# Patient Record
Sex: MALE
[De-identification: names, ages, dates, MRNs, and addresses within clinical notes are randomized per-mention and may not be internally consistent; named-entity substitution may affect disease eponyms.]

---

## 2018-05-06 ENCOUNTER — HOSPITAL ENCOUNTER (EMERGENCY)
Dept: HOSPITAL 46 - ED | Age: 28
Discharge: HOME | End: 2018-05-06
Payer: COMMERCIAL

## 2018-05-06 VITALS — WEIGHT: 190 LBS | HEIGHT: 72 IN | BODY MASS INDEX: 25.73 KG/M2

## 2018-05-06 DIAGNOSIS — Z79.899: ICD-10-CM

## 2018-05-06 DIAGNOSIS — Z88.8: ICD-10-CM

## 2018-05-06 DIAGNOSIS — N48.29: Primary | ICD-10-CM

## 2018-05-06 DIAGNOSIS — F90.9: ICD-10-CM

## 2018-12-27 ENCOUNTER — HOSPITAL ENCOUNTER (OUTPATIENT)
Dept: HOSPITAL 46 - OPS | Age: 28
Discharge: HOME | End: 2018-12-27
Attending: ORTHOPAEDIC SURGERY
Payer: COMMERCIAL

## 2018-12-27 VITALS — HEIGHT: 72 IN | BODY MASS INDEX: 25.73 KG/M2 | WEIGHT: 189.99 LBS
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
## 2018-12-27 DIAGNOSIS — S62.625P: Primary | ICD-10-CM

## 2018-12-27 DIAGNOSIS — Z79.1: ICD-10-CM

## 2018-12-27 PROCEDURE — C1713 ANCHOR/SCREW BN/BN,TIS/BN: HCPCS

## 2018-12-27 PROCEDURE — 0RGV04Z FUSION OF LEFT METACARPOPHALANGEAL JOINT WITH INTERNAL FIXATION DEVICE, OPEN APPROACH: ICD-10-PCS | Performed by: ORTHOPAEDIC SURGERY

## 2018-12-27 NOTE — NUR
1420: VS CHECKED. PATIENT TOLERATING WATER AND CRACKERS. PATIENT ASSISTED OOB
TO BATHROOM. GAIT STEADY. VOID WITHOUT DIFFICULTY. GAIT STEADY BACK TO ROOM.
IV DC'D WNL. TIP INTACT. DRESSNG APPLIED. DISCHARGE INSTRUCTIONS GIVEN TO
PATIENT AND GIRLFRIEND. PATIENT DISCHARGED TO HOME VIA WHEELCHAIR WITH
GIRLFRIEND.

## 2018-12-27 NOTE — NUR
12/27/18 1301 Luzma Murrieta
1251- PT ARRIVES TO PACU SITTING UP IN BED AND LOOKING AROUND. PT
APPEARS CONFUSED. EDUCATED PT THAT SURGERY IS OVER AND HE IS IN THE
RECOVERY ROOM. RESP EVEN AND UNLABORED. OXYGEN SAT HIGH 90'S %
ON RA.
1255- PT GIVEN WARM BLANKETS AND WARM AIR TURNED ON AS HE STATES HE IS
FEELING COLD. PT STATES THIS IS HELPING.

## 2018-12-27 NOTE — NUR
1330: PATIENT BACK IN DAY SURGERY ROOM FROM PACU. C/O PAIN 8/10. GIVEN
CRACKERS AND ICE WATER. MEDICATED FOR PAIN WITH 1 TAB OF NORCO. VS CHECKED.
CAP REFILL TO LEFT FINGERS WNL. LEFT RING FINGER DRESSING CDI. IV SITE WNL.
SCDs ON. CALL LIGHT WITHIN REACH. GIRLFRIEND AT BEDSIDE.

## 2019-01-07 NOTE — OR
[facility]
                                    [address], [place]  [address]
_________________________________________________________________________________________
                                                                 Signed   
 
 
DATE OF OPERATION:
 
SURGEON:
Analia Easley MD
 
DATE OF SURGERY:
12/27/2018
 
PREOPERATIVE DIAGNOSIS:
Malunion fracture dislocation left ring proximal interphalangeal joint.
 
POSTOPERATIVE DIAGNOSIS:
Malunion fracture dislocation left ring proximal interphalangeal joint.
 
PROCEDURE:
Attempted open reduction and internal fixation followed by fusion of the left ring
metacarpophalangeal joint. 
 
ANESTHESIA:
General.
 
SPECIMENS AND COMPLICATIONS:
There were no specimens or complications.
 
TOURNIQUET TIME:
About 45 minutes.
 
WHAT WAS DONE:
The patient was taken to the operating room. After anesthesia was induced and airway
secured, the left upper extremity was positioned, prepped and draped in the routine
sterile fashion. A dorsal incision was made over the PIP joint through skin and
subcutaneous tissue. The extensor mechanism was split but left attached to the base of
the middle phalanx. We then examined the PIP joint, and there was virtually no articular
cartilage remaining on either the condyles of the proximal phalanx or on a good portion
of the base of the middle phalanx. We did release the ray of the ulnar collateral
ligament and were able to presumptively reduced the joint but it was completely unstable
and the volar fragment was not substantial enough for internal fixation. We therefore
hyperflexed the joining, used a small curette and small rongeur, removed the remaining
articular surface off the condyles of the proximal phalanx and the base of the middle
phalanx. We then placed the finger in neutral rotation, neutral varus valgus and about
40 degrees of flexion. We secured it with a single K-wire then augmented the fixation
with two lag screws and we then removed the K-wire. This gave us a solid construct and
 
    Electronically Signed By: ANALIA EASLEY MD  01/07/19 0832
_________________________________________________________________________________________
PATIENT NAME:     KHADIJAH PERRIN                    
MEDICAL RECORD #: [id number]            OPERATIVE REPORT              
          ACCT #: [id number]  
DATE OF BIRTH:   [birth year]            REPORT #: [id number]      
PHYSICIAN:        ANALIA EASLEY MD      
PCP:              NO PRIMARY CARE PHYSICIAN     
REPORT IS CONFIDENTIAL AND NOT TO BE RELEASED WITHOUT AUTHORIZATION
 
 
                                  [facility]
                                    [address], [place]  [address]
_________________________________________________________________________________________
                                                                 Signed   
 
 
good alignment and position. The wound was gently irrigated, closed in standard fashion.
Sterile dressing and a splint applied. The patient was awakened and taken to the
recovery 
room and arrived in stable condition. Counts were correct and antibiotic protocols were
followed. 
 
 
 
            ________________________________________
            Analia Easley MD 
 
 
WFB/MODL
Job #:  [id number]
DD:  01/02/2019 09:34:34
DT:  01/02/2019 14:45:59
 
 
Copies:                                
~
 
 
 
 
 
 
 
 
 
 
 
 
 
 
 
 
 
 
 
 
 
 
 
    Electronically Signed By: ANALIA EASLEY MD  01/07/19 0832
_________________________________________________________________________________________
PATIENT NAME:     KHADIJAH PERRIN                    
MEDICAL RECORD #: [id number]            OPERATIVE REPORT              
          ACCT #: [id number]  
DATE OF BIRTH:   [birth year]            REPORT #: [id number]      
PHYSICIAN:        ANALIA EASLEY MD      
PCP:              NO PRIMARY CARE PHYSICIAN     
REPORT IS CONFIDENTIAL AND NOT TO BE RELEASED WITHOUT AUTHORIZATION

## 2019-06-29 ENCOUNTER — HOSPITAL ENCOUNTER (EMERGENCY)
Dept: HOSPITAL 46 - ED | Age: 29
End: 2019-06-29
Payer: COMMERCIAL

## 2019-06-29 VITALS — WEIGHT: 180.01 LBS | HEIGHT: 72 IN | BODY MASS INDEX: 24.38 KG/M2
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
## 2019-06-29 DIAGNOSIS — Z87.891: ICD-10-CM

## 2019-06-29 DIAGNOSIS — Z88.1: ICD-10-CM

## 2019-06-29 DIAGNOSIS — S09.90XA: Primary | ICD-10-CM

## 2019-06-29 PROCEDURE — G0480 DRUG TEST DEF 1-7 CLASSES: HCPCS
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
## 2019-06-30 ENCOUNTER — HOSPITAL ENCOUNTER (EMERGENCY)
Dept: HOSPITAL 46 - ED | Age: 29
Discharge: HOME | End: 2019-06-30
Payer: COMMERCIAL

## 2019-06-30 VITALS — WEIGHT: 180.01 LBS | HEIGHT: 72 IN | BODY MASS INDEX: 24.38 KG/M2

## 2019-06-30 DIAGNOSIS — Z88.1: ICD-10-CM

## 2019-06-30 DIAGNOSIS — S00.83XA: ICD-10-CM

## 2019-06-30 DIAGNOSIS — Z87.891: ICD-10-CM
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
## 2019-06-30 DIAGNOSIS — Z79.899: ICD-10-CM

## 2019-06-30 DIAGNOSIS — S06.0X0A: Primary | ICD-10-CM

## 2019-06-30 DIAGNOSIS — H72.92: ICD-10-CM

## 2019-06-30 DIAGNOSIS — F90.9: ICD-10-CM
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
## 2020-08-05 NOTE — XMS
PreManage Notification: KHADIJAH PERRIN MRN:[id number]
 
Security Information
 
Security Events
1 event(s) in the past 18 months
Most recent security events:
 
Elopement at [facility] 06/29/2019 19:33
  -    Other
Details:
    PATIENT LEFT AMA.
 
 
 
CRITERIA MET
------------
- Group Notification
- [facility] - Has Care Guidelines
- PDMP
 
 
CARE PROVIDERS
There are no care providers on record at this time.
 
Joanie has no Care Guidelines for this patient.
Care History
Medical/Surgical
07/01/2019    [facility]
 
      - [facility] CASE MANAGEMENT REFERRAL MADE- PATIENT HAS [facility] AND NO PCP.
E.D. VISIT COUNT (12 MO.)
-------------------------------------------------------------------------------------
1 [facility]
 
1 [facility]
 
1 [facility]
-------------------------------------------------------------------------------------
TOTAL 3
-------------------------------------------------------------------------------------
NOTE: Visits indicate total known visits.
 
ED/[facility] VISIT TRACKING (12 MO.)
-------------------------------------------------------------------------------------
08/05/2020 10:26
[facility] [place]
 
TYPE: Emergency
 
COMPLAINT:
- VOMITING, SWEATING
-------------------------------------------------------------------------------------
05/27/2020 09:08
[facility] [place]
 
TYPE: Emergency
 
DIAGNOSES:
 
- Dental Pain
- Dental caries, unspecified
-------------------------------------------------------------------------------------
08/20/2019 18:19
[facility] [place]
 
TYPE: Emergency
 
DIAGNOSES:
- CONCUSSION
- Postconcussional syndrome
-------------------------------------------------------------------------------------
 
 
INPATIENT VISIT TRACKING (12 MO.)
No inpatient visits to display in this time frame
 
[email or web address]